# Patient Record
Sex: FEMALE | Race: BLACK OR AFRICAN AMERICAN
[De-identification: names, ages, dates, MRNs, and addresses within clinical notes are randomized per-mention and may not be internally consistent; named-entity substitution may affect disease eponyms.]

---

## 2019-03-13 ENCOUNTER — HOSPITAL ENCOUNTER (EMERGENCY)
Dept: HOSPITAL 80 - FED | Age: 29
Discharge: HOME | End: 2019-03-13
Payer: MEDICAID

## 2019-03-13 VITALS — DIASTOLIC BLOOD PRESSURE: 98 MMHG | SYSTOLIC BLOOD PRESSURE: 138 MMHG

## 2019-03-13 DIAGNOSIS — M25.561: Primary | ICD-10-CM

## 2019-03-13 NOTE — EDPHY
H & P


Time Seen by Provider: 03/13/19 15:55


HPI/ROS: 





CHIEF COMPLAINT:  Knee pain





HISTORY OF PRESENT ILLNESS:  28-year-old female presents emergency department 

reporting that a month ago while she was running she develops lateral right 

knee pain.  Since that time she has intermittently had pain which is worse when 

she is standing, running, or walking.  She has noted mild swelling on occasion.

  This is the 1st time the patient has sought care for her knee injury.  No 

prior history of knee surgery or known injury.  Patient denies hearing a pop 

when she was running.





No fever, chills, chest pain, shortness of breath, palpitations, vomiting, 

diarrhea, urinary complaints, headache, lightheadedness.  





REVIEW OF SYSTEMS: 


A comprehensive 10 system review of systems was reviewed and is otherwise 

negative aside from elements mentioned in the history of present illness and 

medical decision making.





PAST MEDICAL HISTORY:  Patient denies.





SOCIAL HISTORY:  Patient was recently incarcerated,  She smokes a pack-a-day.





GENERAL APPEARANCE:  Pleasant, alert, reporting pain in her knee.


FOCUSED EXAM OF right knee:  Very minimal swelling is appreciated over the 

lateral patellar area.  Patient indicates the area pain is along the lateral 

aspect of the knee.  No tenderness over the patella.  Normal straight leg raise 

test.  Negative anterior drawer and posterior drawer.  Pain with varus and 

valgus stressing with the knee in 30 degrees of flexion.  No instability you 

with the knee straight.  No significant laxity on varus and valgus stress.


Neurovascular exam:  Distal sensation intact to light touch.  Brisk capillary 

refill.  Normal dorsalis pedis and posterior tibial pulse.


Smoking Status: Never smoked


Constitutional: 


 Initial Vital Signs











Temperature (C)  36.8 C   03/13/19 15:42


 


Heart Rate  88   03/13/19 15:42


 


Respiratory Rate  16   03/13/19 15:42


 


Blood Pressure  112/74   03/13/19 15:42


 


O2 Sat (%)  98   03/13/19 15:42








 











O2 Delivery Mode               Room Air














Allergies/Adverse Reactions: 


 





No Known Allergies Allergy (Unverified 03/13/19 17:42)


 








Home Medications: 














 Medication  Instructions  Recorded


 


NK [No Known Home Meds]  03/13/19














Medical Decision Making





- Diagnostics


Imaging Results: 





Xray:  Right knee x-ray was obtained.  I viewed the images myself on the PACS 

system.  My interpretation of the images is:  No fracture. The radiology 

interpretation is:  Pending at this time.  I discussed the results with the 

patient.  


Imaging: I viewed and interpreted images myself


ED Course/Re-evaluation: 





28-year-old female with 1 month history of knee pain, normal x-rays.





Patient was discharged with an Ace wrap as well as with instructions regarding 

ice, ibuprofen, and follow up with Orthopedic surgery and physical therapy.


Differential Diagnosis: 





Diff dx considered included sprain, internal derangement, fracture, contusion, 

effusion, septic joint, arthritis, inflammatory joint disease.





Departure





- Departure


Disposition: Home, Routine, Self-Care


Clinical Impression: 


 Knee pain





Condition: Good


Instructions:  Knee Sprain (ED)


Additional Instructions: 


DC instructions give by hard copy due to downtime documentation.


RICE


Follow up at [facility] ([facility])


Ibuprofen as needed.


Ace wrap provided.


Referrals: 


NONE *PRIMARY CARE P,. [Primary Care Provider] - As per Instructions

## 2019-03-18 ENCOUNTER — HOSPITAL ENCOUNTER (EMERGENCY)
Dept: HOSPITAL 80 - FED | Age: 29
Discharge: HOME | End: 2019-03-18
Payer: MEDICAID

## 2019-03-18 VITALS — SYSTOLIC BLOOD PRESSURE: 116 MMHG | DIASTOLIC BLOOD PRESSURE: 74 MMHG

## 2019-03-18 DIAGNOSIS — J45.909: ICD-10-CM

## 2019-03-18 DIAGNOSIS — J10.1: Primary | ICD-10-CM

## 2019-03-18 NOTE — EDPHY
H & P


Stated Complaint: FEVER, COUGH THROAT PAIN , ACHES ALL OVER


Time Seen by Provider: 03/18/19 22:28


HPI/ROS: 





HPI


The patient presents with sore throat, cough, rhinorrhea, left ear pain, fever.

  Her symptoms began last night with a sore throat when she went to bed.  She 

awoke this morning and had ongoing sore throat and then developed a 

nonproductive cough, clear rhinorrhea, then a fever.  She took ibuprofen about 

6 hr ago.  Her fever persists so she comes in for further care.  She now is 

complaining of a diffuse aching headache which is moderate in severity.  She 

says that when she coughs she has an aching pain in her anterior chest which is 

diffuse.  She is living at a longterm house and believe she could of been 

exposed to sick people.  She did not have a flu vaccine this year..





REVIEW OF SYSTEMS


10 systems were reviewed and negative with the exception of the elements 

mentioned in the history of present illness.





PMHx:  Asthma, history of PTSD, attention deficit hyperactivity disorder





Soc Hx:  Resides at a longterm house currently, nonsmoker, no primary care doctor








PHYSICAL


General Appearance: Alert, no distress


Eyes: Pupils equal and round no pallor or injection


ENT, Mouth: Mucous membranes moist, posterior pharynx is injected, TMs 

bilaterally are slightly erythematous


Respiratory: There are no retractions, lungs are clear to auscultation, no 

chest wall tenderness


Cardiovascular:  Tachycardic rate and regular rhythm


Gastrointestinal:  Abdomen is soft and non-tender, no masses, bowel sounds 

normal 


Neurological:  A&O, moves all extremities


Skin:  Warm and dry, no rashes


Musculoskeletal: Neck is supple non tender 


Extremities:  symmetrical, full range of motion 


Psychiatric:  Patient is oriented X 3, there is no agitation 





Source: Patient


Exam Limitations: No limitations





- Personal History


LMP (Females 10-55): 1-7 Days Ago


Current Tetanus/Diphtheria Vaccine: Yes


Current Tetanus Diphtheria and Acellular Pertussis (TDAP): Yes





- Medical/Surgical History


Hx Asthma: Yes


Hx Chronic Respiratory Disease: No


Hx Diabetes: No


Hx Cardiac Disease: No


Hx Renal Disease: No


Hx Cirrhosis: No


Hx Alcoholism: No


Hx HIV/AIDS: No


Hx Splenectomy or Spleen Trauma: No


Other PMH: ptsd, adhd, anxiety, all, asthma





- Social History


Smoking Status: Never smoked


Constitutional: 


 Initial Vital Signs











Temperature (C)  39.3 C H  03/18/19 22:23


 


Heart Rate  110 H  03/18/19 22:23


 


Respiratory Rate  20   03/18/19 22:23


 


Blood Pressure  142/75 H  03/18/19 22:23


 


O2 Sat (%)  96   03/18/19 22:23








 











O2 Delivery Mode               Room Air














Allergies/Adverse Reactions: 


 





No Known Allergies Allergy (Unverified 03/18/19 22:26)


 








Home Medications: 














 Medication  Instructions  Recorded


 


Wellbutrin 75mg (*)  02/16/19


 


buPROPion [Wellbutrin 75mg (*)] 75 mg PO BID #10 tab 02/16/19


 


traZODone  02/16/19


 


traZODone [traZODONE 100MG (*)] 100 mg PO HS PRN #5 tab 02/16/19


 


Oseltamivir Phosphate [Tamiflu 75 75 mg PO BID 5 Days  cap 03/18/19





mg (*)]  














Medical Decision Making


Differential Diagnosis: 





This is a 28-year-old female with asthma, currently residing at a longterm house 

who presents with fever, upper respiratory tract symptoms, chest pain.  On exam

, she is febrile, tachycardic.





I suspect influenza, I doubt pneumonia given clear breath sounds bilaterally, I 

doubt strep pharyngitis.





In the emergency department, patient received ibuprofen, Tylenol.  Rapid flu 

was performed.  This was positive for flu A., given 1 day of symptoms and 

underlying asthma I will treat with Tamiflu.  I have discussed other supportive 

measures.  She will be discharged from the emergency department.





Other differential diagnoses considered include pneumonia, upper respiratory 

tract infection.  





- Data Points


Laboratory Results: 


 











  03/18/19





  22:37


 


Nasal Influenza A PCR  FLU A DETECTED  H 





   (NEGATIVE) 


 


Nasal Influenza B PCR  NEGATIVE FOR FLU B 





   (NEGATIVE) 











Medications Given: 


 








Discontinued Medications





Acetaminophen (Tylenol)  1,000 mg PO EDNOW ONE


   Stop: 03/18/19 22:29


   Last Admin: 03/18/19 22:33 Dose:  1,000 mg


Ibuprofen (Motrin)  600 mg PO EDNOW ONE


   Stop: 03/18/19 22:29


   Last Admin: 03/18/19 22:34 Dose:  600 mg


Oseltamivir Phosphate (Tamiflu)  75 mg PO EDNOW ONE


   Stop: 03/18/19 23:19


   Last Admin: 03/18/19 23:22 Dose:  75 mg








Departure





- Departure


Disposition: Home, Routine, Self-Care


Clinical Impression: 


 Influenza A





Condition: Good


Instructions:  Influenza (ED)


Additional Instructions: 


Please make sure to drink plenty of fluids.  I recommend you take ibuprofen 400 

mg with acetaminophen 650 mg every 6 hr as needed for fever.  Return to the 

emergency department if your worse in any way.


Referrals: 


[facility],. [Clinic] - As per Instructions


Stand Alone Forms:  Work Excuse


Prescriptions: 


Oseltamivir Phosphate [Tamiflu 75 mg (*)] 75 mg PO BID 5 Days  cap